# Patient Record
Sex: FEMALE | Race: WHITE
[De-identification: names, ages, dates, MRNs, and addresses within clinical notes are randomized per-mention and may not be internally consistent; named-entity substitution may affect disease eponyms.]

---
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
## 2020-02-17 ENCOUNTER — HOSPITAL ENCOUNTER (EMERGENCY)
Dept: HOSPITAL 62 - ER | Age: 20
LOS: 1 days | Discharge: HOME | End: 2020-02-18
Payer: SELF-PAY

## 2020-02-17 DIAGNOSIS — R09.89: ICD-10-CM

## 2020-02-17 DIAGNOSIS — J02.9: ICD-10-CM

## 2020-02-17 DIAGNOSIS — R09.81: ICD-10-CM

## 2020-02-17 DIAGNOSIS — J20.8: Primary | ICD-10-CM

## 2020-02-17 DIAGNOSIS — R50.9: ICD-10-CM

## 2020-02-17 DIAGNOSIS — R11.10: ICD-10-CM

## 2020-02-17 DIAGNOSIS — R05: ICD-10-CM

## 2020-02-17 DIAGNOSIS — B97.89: ICD-10-CM

## 2020-02-17 DIAGNOSIS — R51: ICD-10-CM

## 2020-02-17 DIAGNOSIS — E86.0: ICD-10-CM

## 2020-02-17 DIAGNOSIS — M79.10: ICD-10-CM

## 2020-02-17 LAB
A TYPE INFLUENZA AG: NEGATIVE
ADD MANUAL DIFF: NO
ALBUMIN SERPL-MCNC: 4.2 G/DL (ref 3.5–5)
ALP SERPL-CCNC: 65 U/L (ref 38–126)
ANION GAP SERPL CALC-SCNC: 12 MMOL/L (ref 5–19)
APPEARANCE UR: (no result)
APTT PPP: YELLOW S
AST SERPL-CCNC: 17 U/L (ref 14–36)
B INFLUENZA AG: NEGATIVE
BASOPHILS # BLD AUTO: 0.1 10^3/UL (ref 0–0.2)
BASOPHILS NFR BLD AUTO: 0.5 % (ref 0–2)
BILIRUB DIRECT SERPL-MCNC: 0 MG/DL (ref 0–0.4)
BILIRUB SERPL-MCNC: 0.6 MG/DL (ref 0.2–1.3)
BILIRUB UR QL STRIP: NEGATIVE
BUN SERPL-MCNC: 7 MG/DL (ref 7–20)
CALCIUM: 9.2 MG/DL (ref 8.4–10.2)
CHLORIDE SERPL-SCNC: 105 MMOL/L (ref 98–107)
CO2 SERPL-SCNC: 22 MMOL/L (ref 22–30)
EOSINOPHIL # BLD AUTO: 0.6 10^3/UL (ref 0–0.6)
EOSINOPHIL NFR BLD AUTO: 3 % (ref 0–6)
ERYTHROCYTE [DISTWIDTH] IN BLOOD BY AUTOMATED COUNT: 14.3 % (ref 11.5–14)
GLUCOSE SERPL-MCNC: 89 MG/DL (ref 75–110)
GLUCOSE UR STRIP-MCNC: NEGATIVE MG/DL
HCT VFR BLD CALC: 40.2 % (ref 36–47)
HGB BLD-MCNC: 13.6 G/DL (ref 12–15.5)
KETONES UR STRIP-MCNC: NEGATIVE MG/DL
LYMPHOCYTES # BLD AUTO: 1.9 10^3/UL (ref 0.5–4.7)
LYMPHOCYTES NFR BLD AUTO: 9.9 % (ref 13–45)
MCH RBC QN AUTO: 28 PG (ref 27–33.4)
MCHC RBC AUTO-ENTMCNC: 33.9 G/DL (ref 32–36)
MCV RBC AUTO: 83 FL (ref 80–97)
MONOCYTES # BLD AUTO: 1.3 10^3/UL (ref 0.1–1.4)
MONOCYTES NFR BLD AUTO: 6.5 % (ref 3–13)
NEUTROPHILS # BLD AUTO: 15.7 10^3/UL (ref 1.7–8.2)
NEUTS SEG NFR BLD AUTO: 80.1 % (ref 42–78)
PH UR STRIP: 5 [PH] (ref 5–9)
PLATELET # BLD: 288 10^3/UL (ref 150–450)
POTASSIUM SERPL-SCNC: 3.9 MMOL/L (ref 3.6–5)
PROT SERPL-MCNC: 7.3 G/DL (ref 6.3–8.2)
PROT UR STRIP-MCNC: 30 MG/DL
RBC # BLD AUTO: 4.86 10^6/UL (ref 3.72–5.28)
SP GR UR STRIP: 1.02
TOTAL CELLS COUNTED % (AUTO): 100 %
UROBILINOGEN UR-MCNC: NEGATIVE MG/DL (ref ?–2)
WBC # BLD AUTO: 19.6 10^3/UL (ref 4–10.5)

## 2020-02-17 PROCEDURE — 84703 CHORIONIC GONADOTROPIN ASSAY: CPT

## 2020-02-17 PROCEDURE — 99283 EMERGENCY DEPT VISIT LOW MDM: CPT

## 2020-02-17 PROCEDURE — S0119 ONDANSETRON 4 MG: HCPCS

## 2020-02-17 PROCEDURE — 85025 COMPLETE CBC W/AUTO DIFF WBC: CPT

## 2020-02-17 PROCEDURE — 81001 URINALYSIS AUTO W/SCOPE: CPT

## 2020-02-17 PROCEDURE — 87070 CULTURE OTHR SPECIMN AEROBIC: CPT

## 2020-02-17 PROCEDURE — 71046 X-RAY EXAM CHEST 2 VIEWS: CPT

## 2020-02-17 PROCEDURE — 96374 THER/PROPH/DIAG INJ IV PUSH: CPT

## 2020-02-17 PROCEDURE — 96361 HYDRATE IV INFUSION ADD-ON: CPT

## 2020-02-17 PROCEDURE — 36415 COLL VENOUS BLD VENIPUNCTURE: CPT

## 2020-02-17 PROCEDURE — 87804 INFLUENZA ASSAY W/OPTIC: CPT

## 2020-02-17 PROCEDURE — 94640 AIRWAY INHALATION TREATMENT: CPT

## 2020-02-17 PROCEDURE — 80053 COMPREHEN METABOLIC PANEL: CPT

## 2020-02-17 PROCEDURE — 87880 STREP A ASSAY W/OPTIC: CPT

## 2020-02-17 NOTE — RADIOLOGY REPORT (SQ)
EXAM DESCRIPTION: 



XR CHEST 2 VIEWS



COMPLETED DATE/TME:  02/17/2020 22:54



CLINICAL HISTORY: 



20 years, Female, cough



COMPARISON:

None.



NUMBER OF VIEWS:

2



TECHNIQUE:

2 views of the chest



LIMITATIONS:

None.



FINDINGS:



Heart size normal. Lungs clear. No pneumothorax



IMPRESSION:



Negative chest

 



copyright 2011 [employer]- All Rights Reserved

## 2020-02-18 VITALS — SYSTOLIC BLOOD PRESSURE: 126 MMHG | DIASTOLIC BLOOD PRESSURE: 81 MMHG

## 2020-02-18 NOTE — ER DOCUMENT REPORT
ED Flu Like





- General


Chief Complaint: Flu Symptoms


Stated Complaint: FEVER,VOMITING,BODY ACHES


Time Seen by Provider: 02/17/20 19:23


Notes: 





CHIEF COMPLAINT: Cough, vomiting with cough, fever





HPI: 20-year-old female presenting with a sibling for similar complaints 

complaining of cough over the last 2 days with generalized body ache.  Has had 

multiple episodes of vomiting today with coughing episodes.  Patient feels like 

she is subjectively wheezing.  States she has had a low-grade fever today.  

Denies abdominal pain or diarrhea.  Denies chest pain.  Complains of frontal 

headache and facial pain with congestion, complains of mild sore throat





ROS: See HPI - all other systems were reviewed and are otherwise negative


Constitutional: Positive fever 


Eyes: no drainage, no blurred vision


ENT: Positive runny nose, positive sore throat, mild sinus pressure


Cardiovascular:  no chest pain 


Resp: Positive SOB, positive cough


GI: Positive vomiting after cough, no diarrhea, no abdominal pain


: no dysuria


Integumentary: no rash 


Allergy: no hives 


Musculoskeletal: no extremity pain or swelling 


Neurological: no numbness/tingling, no weakness





MEDICATIONS: I agree with the patient medications as charted by the RN.





ALLERGIES: I agree with the allergies as charted by the RN.





PAST MEDICAL HISTORY/PAST SURGICAL HISTORY: Reviewed and agree as charted by RN.





SOCIAL HISTORY: Reviewed and agree as charted by RN.





FAMILY HISTORY: No significant familial comorbid conditions directly related to 

patient complaint





EXAM:


Reviewed vital signs as charted by RN. 


CONSTITUTIONAL: Alert and oriented and responds appropriately to questions. Wel

l-appearing; well-nourished, mild distress secondary to cough


EYES: PERRL; Conjunctivae clear, sclerae non-icteric


ENT: normal nose; positive clear rhinorrhea; moist mucous membranes; pharynx 

without lesions noted, no uvula edema or deviation, no tonsillar hypertrophy, 

phonation normal


NECK: Supple without meningismus; non-tender; no cervical lymphadenopathy, no 

masses


CARD: Tachycardic; no murmurs, no clicks, no rubs, no gallops; symmetric distal 

pulses


RESP: Normal chest excursion without splinting or tachypnea; breath sounds clear

and equal bilaterally; no wheezes, no rhonchi, no rales, pulse oximetry 99% on 

room air not hypoxic.  Spastic cough is noted


ABD/GI: Normal bowel sounds; non-distended; soft, non-tender, no rebound, no 

guarding; no palpable organomegaly or masses.


BACK:  The back appears normal and is non-tender to palpation, there is no CVA 

tenderness


EXT: Normal ROM in all joints; non-tender to palpation; no cyanosis, no 

effusions, no edema   


SKIN: Normal color for age and race; warm; dry; good turgor; no acute lesions 

noted


NEURO: Moves all extremities equally; Motor and sensory function intact 


PSYCH: The patient's mood and manner are appropriate. Grooming and personal 

hygiene are appropriate.





MDM: 20-year-old female presenting for flulike symptoms for 2 days.  Vomiting is

mostly posttussive.  Her lung sounds are clear to auscultation chest x-ray 

ordered in the triage process was negative for acute findings.  Her lab work 

shows a moderate leukocytosis this may be reactive to the vomiting episode she 

has been having with the cough.  She has absolutely no abdominal pain on exam.  

She has no infiltrate on x-ray suggesting pneumonia.  She is tachycardic still, 

will give IV fluids, breathing treatment and reassess


TRAVEL OUTSIDE OF THE [place] IN LAST 30 DAYS: No





- Related Data


Allergies/Adverse Reactions: 


                                        





Penicillins Allergy (Verified 12/11/18 10:45)


   








Home Medications: birthcontrol, zoloft, buspar, naproxen,





Past Medical History





- Social History


Smoking Status: Never Smoker


Chew tobacco use (# tins/day): No


Frequency of alcohol use: None


Drug Abuse: Marijuana


Family History: Reviewed & Not Pertinent


Patient has suicidal ideation: No


Patient has homicidal ideation: No


Neurological Medical History: Reports: Hx Migraine


Renal/ Medical History: Denies: Hx Peritoneal Dialysis


Psychiatric Medical History: Reports: Hx Depression


Past Surgical History: Reports: Hx Appendectomy





- Immunizations


Immunizations up to date: Yes


Hx Diphtheria, Pertussis, Tetanus Vaccination: Yes





Physical Exam





- Vital signs


Vitals: 


                                        











Temp Pulse Resp BP Pulse Ox


 


 99.8 F   121 H  20   141/97 H  100 


 


 02/17/20 18:44  02/17/20 18:44  02/17/20 18:44  02/17/20 18:44  02/17/20 18:44














Course





- Re-evaluation


Re-evalutation: 





02/18/20 02:39


Heart rate has improved.  Patient feels better after breathing treatment states 

that she has an inhaler at home.  Will write patient for Zofran for nausea 

although it sounded mostly posttussive.  Her elevated leukocytosis was likely 

reactive to her vomiting episodes.  She has no abdominal pain on repeat exam.  

No dysuria complaint.  Sister had similar symptoms likely viral etiology





- Vital Signs


Vital signs: 


                                        











Temp Pulse Resp BP Pulse Ox


 


 98.6 F   102 H  16   130/81 H  97 


 


 02/18/20 01:05  02/18/20 01:05  02/18/20 01:05  02/18/20 01:05  02/18/20 01:05














- Laboratory


Result Diagrams: 


                                 02/17/20 19:28





                                 02/17/20 19:28


Laboratory results interpreted by me: 


                                        











  02/17/20 02/17/20 02/17/20





  19:28 19:28 19:28


 


WBC  19.6 H  


 


RDW  14.3 H  


 


Lymph % (Auto)  9.9 L  


 


Absolute Neuts (auto)  15.7 H  


 


Seg Neutrophils %  80.1 H  


 


Creatinine   0.43 L 


 


Urine Protein    30 H


 


Urine Blood    LARGE H


 


Leukocyte Esterase Rfl    TRACE H














Discharge





- Discharge


Clinical Impression: 


 Acute bronchospasm due to viral infection, Dehydration





Vomiting


Qualifiers:


 Vomiting type: unspecified Vomiting Intractability: unspecified Nausea presence

: unspecified Qualified Code(s): R11.10 - Vomiting, unspecified





Condition: Stable


Disposition: HOME, SELF-CARE


Additional Instructions: 


Use your albuterol inhaler 2 puffs every 4 hours for coughing or wheezing.  Take

Zofran for recurrent nausea or vomiting.  Follow-up primary care provider for 

reevaluation call for appointment


Prescriptions: 


Ondansetron [Zofran Odt 4 mg Tablet] 1 - 2 tab PO Q4H PRN #15 tab.rapdis


 PRN Reason: For Nausea/Vomiting


Forms:  Return to Work

## 2020-06-27 ENCOUNTER — HOSPITAL ENCOUNTER (EMERGENCY)
Dept: HOSPITAL 62 - ER | Age: 20
Discharge: HOME | End: 2020-06-27
Payer: SELF-PAY

## 2020-06-27 VITALS — SYSTOLIC BLOOD PRESSURE: 136 MMHG | DIASTOLIC BLOOD PRESSURE: 86 MMHG

## 2020-06-27 DIAGNOSIS — B27.90: Primary | ICD-10-CM

## 2020-06-27 DIAGNOSIS — R00.0: ICD-10-CM

## 2020-06-27 DIAGNOSIS — J02.9: ICD-10-CM

## 2020-06-27 DIAGNOSIS — R50.9: ICD-10-CM

## 2020-06-27 PROCEDURE — 87077 CULTURE AEROBIC IDENTIFY: CPT

## 2020-06-27 PROCEDURE — 87880 STREP A ASSAY W/OPTIC: CPT

## 2020-06-27 PROCEDURE — 86308 HETEROPHILE ANTIBODY SCREEN: CPT

## 2020-06-27 PROCEDURE — 87070 CULTURE OTHR SPECIMN AEROBIC: CPT

## 2020-06-27 PROCEDURE — 99283 EMERGENCY DEPT VISIT LOW MDM: CPT

## 2020-06-27 PROCEDURE — 36415 COLL VENOUS BLD VENIPUNCTURE: CPT

## 2020-06-27 NOTE — ER DOCUMENT REPORT
ED [facility]





- General


Chief Complaint: Sore Throat


Stated Complaint: SORE THROAT/FEVER


Time Seen by Provider: 06/27/20 08:35


Notes: 





CHIEF COMPLAINT: Sore throat for 2 days with fever today





HPI: 20-year-old female presenting to the emergency department complaining of 

sore throat more prominent on the left side that began yesterday.  Fever today 

up to 101.  Denies cough, abdominal pain nausea vomiting or rash.





ROS: See HPI - all other systems were reviewed and are otherwise negative


Constitutional: + fever 


Eyes: no drainage, no blurred vision


ENT: no runny nose, + sore throat


Cardiovascular:  no chest pain 


Resp: no SOB, no cough


GI: no vomiting, no diarrhea, no abdominal pain


: no dysuria


Integumentary: no rash 


Allergy: no hives 


Musculoskeletal: no extremity pain or swelling 


Neurological: no numbness/tingling, no weakness





MEDICATIONS: I agree with the patient medications as charted by the RN.





ALLERGIES: I agree with the allergies as charted by the RN.





PAST MEDICAL HISTORY/PAST SURGICAL HISTORY: Reviewed and agree as charted by RN.





SOCIAL HISTORY: Reviewed and agree as charted by RN.





FAMILY HISTORY: No significant familial comorbid conditions directly related to 

patient complaint





EXAM:


Reviewed vital signs as charted by RN.


CONSTITUTIONAL: Alert and oriented and responds appropriately to questions. 

Well-appearing; well-nourished


HEAD: Normocephalic; atraumatic


EYES: PERRL; Conjunctivae clear, sclerae non-icteric


ENT: normal nose; no rhinorrhea; moist mucous membranes; bilateral pharyngeal 

erythema with slight exudate noted, no uvula edema or deviation, no tonsillar 

hypertrophy, phonation normal.  No soft palate swelling.  Phonation is normal


NECK: Supple without meningismus; non-tender; positive left anterior cervical 

lymphadenopathy, no masses


CARD: tachycardic; no murmurs, no clicks, no rubs, no gallops; symmetric distal 

pulses


RESP: Normal chest excursion without splinting or tachypnea; breath sounds clear

and equal bilaterally; no wheezes, no rhonchi, no rales, pulse oximetry 98% on 

room air not hypoxic


ABD/GI: Normal bowel sounds; non-distended; soft, non-tender, no rebound, no 

guarding; no palpable organomegaly or masses.


BACK:  The back appears normal and is non-tender to palpation, there is no CVA 

tenderness


EXT: Normal ROM in all joints; non-tender to palpation; no cyanosis, no 

effusions, no edema   


SKIN: Normal color for age and race; warm; dry; good turgor; no acute lesions 

noted


NEURO: Moves all extremities equally; Motor and sensory function intact 


PSYCH: The patient's mood and manner are appropriate. Grooming and personal 

hygiene are appropriate.





MDM: 20-year-old female with sore throat more prominent on the left but she has 

no soft palate swelling suggesting a definitive abscess at this time.  Her strep

test is negative.  Will obtain Monospot.


TRAVEL OUTSIDE OF THE [place] IN LAST 30 DAYS: No





- Related Data


Allergies/Adverse Reactions: 


                                        





Penicillins Allergy (Verified 06/27/20 08:07)


   











Past Medical History





- Social History


Smoking Status: Never Smoker


Family History: Reviewed & Not Pertinent


Patient has homicidal ideation: No


Neurological Medical History: Reports: Hx Migraine


Renal/ Medical History: Denies: Hx Peritoneal Dialysis


Psychiatric Medical History: Reports: Hx Depression


Past Surgical History: Reports: Hx Appendectomy





- Immunizations


Immunizations up to date: Yes


Hx Diphtheria, Pertussis, Tetanus Vaccination: Yes





Physical Exam





- Vital signs


Vitals: 


                                        











Temp Pulse Resp BP Pulse Ox


 


 98.7 F   127 H  20   130/75 H  97 


 


 06/27/20 07:43  06/27/20 07:43  06/27/20 07:43  06/27/20 07:43  06/27/20 07:43














Course





- Re-evaluation


Re-evalutation: 





06/27/20 10:29


Patient Monospot is positive, strep is negative.  Discussed with the patient we 

discussed return precautions.  Will discharge


06/27/20 10:36


Patient remains mildly tachycardic with a heart rate of 118, states that she 

drank an entire bottle of NyQuil with a decongestant in it prior to coming to 

the emergency department.  Give her strict return precautions regarding the 

tachycardia and hydration as well as medication use Lalit is no longer here





- Vital Signs


Vital signs: 


                                        











Temp Pulse Resp BP Pulse Ox


 


 98.7 F   127 H  20   130/75 H  97 


 


 06/27/20 07:43  06/27/20 07:43  06/27/20 07:43  06/27/20 07:43  06/27/20 07:43














- Laboratory


Laboratory results interpreted by me: 


                                        











  06/27/20





  09:04


 


Monotest  POSITIVE H














Discharge





- Discharge


Clinical Impression: 


 Tachycardia





Mononucleosis


Qualifiers:


 Infectious mononucleosis etiology: unspecified organism Infectious 

mononucleosis complication: without complication Qualified Code(s): B27.90 - 

Infectious mononucleosis, unspecified without complication





Condition: Stable


Disposition: HOME, SELF-CARE


Additional Instructions: 


You are positive for mononucleosis today.  Hydrate well at home.  Motrin for 

fever or body ache.  Salt water gargles for sore throat.  Return to the 

emergency department if you develop abdominal pain or vomiting.  Follow-up with 

her primary care provider for reevaluation as needed


Forms:  Return to Work


Referrals: 


ROB AYON MD [ACTIVE STAFF] - Follow up as needed